# Patient Record
Sex: FEMALE | Race: WHITE | NOT HISPANIC OR LATINO | Employment: STUDENT | URBAN - METROPOLITAN AREA
[De-identification: names, ages, dates, MRNs, and addresses within clinical notes are randomized per-mention and may not be internally consistent; named-entity substitution may affect disease eponyms.]

---

## 2017-02-24 DIAGNOSIS — L70.8 OTHER ACNE: ICD-10-CM

## 2017-02-24 DIAGNOSIS — N94.6 DYSMENORRHEA: ICD-10-CM

## 2017-02-24 RX ORDER — DROSPIRENONE AND ETHINYL ESTRADIOL 0.03MG-3MG
1 KIT ORAL DAILY
Qty: 84 TABLET | Refills: 0 | Status: SHIPPED | OUTPATIENT
Start: 2017-02-24 | End: 2017-05-10

## 2017-02-24 NOTE — TELEPHONE ENCOUNTER
LES, the patient is due for an office visit. I have changed the quantity to #30 and put in a note that the patient is due for an ov.    Pending Prescriptions:                       Disp   Refills    drospirenone-ethinyl estradiol (OCELLA) 3*28 tab*0            Sig: Take 1 tablet by mouth daily      Ready to be faxed when Rx is approved.    Please forward to the  so the can call the patient and help them set up an appointment.      Telephone Information:   Mobile [phone]         Thank-You,  Rhea

## 2017-02-24 NOTE — TELEPHONE ENCOUNTER
Please let the patient know that a 3 month refill has been sent for their prescription.  They are due for a return non-fasting office visit within that time.  Failure to schedule an appointment may result in no further refills of his/her medication.

## 2017-04-04 ENCOUNTER — TELEPHONE (OUTPATIENT)
Dept: FAMILY MEDICINE | Facility: CLINIC | Age: 22
End: 2017-04-04

## 2017-04-04 NOTE — TELEPHONE ENCOUNTER
Radha's mom Lisette called to say that Radha should have refills through May on her Sertraline and BC, however, the pharmacy told her that she could only have one month and needs to be seen.  Mom Says Radha is away at school and did not plan to come back until May which is her 3 year so they are confused as to why they can't get refills through May Please advise    Lisette's phone #[phone]

## 2017-04-04 NOTE — TELEPHONE ENCOUNTER
In 5/2016, I sent refills for a year? Call pharmacy and ask why they are not filling it for a year. If she needs a single month until June to be seen/ that is what I sent?

## 2017-04-04 NOTE — TELEPHONE ENCOUNTER
Spoke to mom to let her know that the pharmacy should have refills through May and she will go speak to the pharmacy

## 2017-05-10 ENCOUNTER — OFFICE VISIT (OUTPATIENT)
Dept: FAMILY MEDICINE | Facility: CLINIC | Age: 22
End: 2017-05-10

## 2017-05-10 VITALS
HEIGHT: 67 IN | SYSTOLIC BLOOD PRESSURE: 108 MMHG | WEIGHT: 152.8 LBS | DIASTOLIC BLOOD PRESSURE: 72 MMHG | BODY MASS INDEX: 23.98 KG/M2 | HEART RATE: 78 BPM | OXYGEN SATURATION: 98 % | TEMPERATURE: 97.6 F

## 2017-05-10 DIAGNOSIS — R06.01 ORTHOPNEA: ICD-10-CM

## 2017-05-10 DIAGNOSIS — L70.8 OTHER ACNE: Primary | ICD-10-CM

## 2017-05-10 DIAGNOSIS — F41.8 MIXED ANXIETY DEPRESSIVE DISORDER: ICD-10-CM

## 2017-05-10 DIAGNOSIS — Z23 NEED FOR VACCINATION: ICD-10-CM

## 2017-05-10 DIAGNOSIS — N94.6 DYSMENORRHEA: ICD-10-CM

## 2017-05-10 DIAGNOSIS — Z71.89 ACP (ADVANCE CARE PLANNING): ICD-10-CM

## 2017-05-10 DIAGNOSIS — J31.0 CHRONIC RHINITIS: ICD-10-CM

## 2017-05-10 PROCEDURE — 90471 IMMUNIZATION ADMIN: CPT | Performed by: PHYSICIAN ASSISTANT

## 2017-05-10 PROCEDURE — 90649 4VHPV VACCINE 3 DOSE IM: CPT | Performed by: PHYSICIAN ASSISTANT

## 2017-05-10 PROCEDURE — 99213 OFFICE O/P EST LOW 20 MIN: CPT | Mod: 25 | Performed by: PHYSICIAN ASSISTANT

## 2017-05-10 RX ORDER — DROSPIRENONE AND ETHINYL ESTRADIOL 0.03MG-3MG
1 KIT ORAL DAILY
Qty: 84 TABLET | Refills: 3 | Status: SHIPPED | OUTPATIENT
Start: 2017-05-10 | End: 2018-05-15

## 2017-05-10 RX ORDER — MONTELUKAST SODIUM 10 MG/1
10 TABLET ORAL AT BEDTIME
Qty: 90 TABLET | Refills: 3 | Status: SHIPPED | OUTPATIENT
Start: 2017-05-10 | End: 2018-05-21

## 2017-05-10 ASSESSMENT — ANXIETY QUESTIONNAIRES
5. BEING SO RESTLESS THAT IT IS HARD TO SIT STILL: NOT AT ALL
7. FEELING AFRAID AS IF SOMETHING AWFUL MIGHT HAPPEN: NOT AT ALL
6. BECOMING EASILY ANNOYED OR IRRITABLE: SEVERAL DAYS
3. WORRYING TOO MUCH ABOUT DIFFERENT THINGS: NOT AT ALL
GAD7 TOTAL SCORE: 2
IF YOU CHECKED OFF ANY PROBLEMS ON THIS QUESTIONNAIRE, HOW DIFFICULT HAVE THESE PROBLEMS MADE IT FOR YOU TO DO YOUR WORK, TAKE CARE OF THINGS AT HOME, OR GET ALONG WITH OTHER PEOPLE: NOT DIFFICULT AT ALL
2. NOT BEING ABLE TO STOP OR CONTROL WORRYING: NOT AT ALL
1. FEELING NERVOUS, ANXIOUS, OR ON EDGE: SEVERAL DAYS

## 2017-05-10 ASSESSMENT — PATIENT HEALTH QUESTIONNAIRE - PHQ9: 5. POOR APPETITE OR OVEREATING: NOT AT ALL
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
## 2017-05-10 NOTE — MR AVS SNAPSHOT
After Visit Summary   5/10/2017    Radha Longoria    MRN: [id number]           Patient Information     Date Of Birth          1995        Visit Information        Provider Department      5/10/2017 2:15 PM Zoe Blevins PA [facility]        Today's Diagnoses     Other acne    -  1    ACP (advance care planning)        Dysmenorrhea        Orthopnea        Chronic rhinitis        Mixed anxiety depressive disorder        Need for vaccination          Care Instructions    1. Zatidor eye drops    2. Flonase daily in the nose.             Follow-ups after your visit        Additional Services     ALLERGY/ASTHMA ADULT REFERRAL       Your provider has referred you to: [facility]: [facility] [phone]   [email or web address]    Please be aware that coverage of these services is subject to the terms and limitations of your health insurance plan.  Call member services at your health plan with any benefit or coverage questions.      Please bring the following to your appointment:  >>   Any x-rays, CTs or MRIs which have been performed.  Contact the facility where they were done to arrange for  prior to your scheduled appointment.  Any new CT, MRI or other procedures ordered by your specialist must be performed at a [facility] facility or coordinated by your clinic's referral office.  >>   List of current medications  >>   This referral request   >>   Any documents/labs given to you for this referral                  Who to contact     If you have questions or need follow up information about today's clinic visit or your schedule please contact [facility] directly at [phone].  Normal or non-critical lab and imaging results will be communicated to you by MyChart, letter or phone within 4 business days after the clinic has received the results. If you do not hear from us within 7 days, please  "contact the clinic through [employer] or phone. If you have a critical or abnormal lab result, we will notify you by phone as soon as possible.  Submit refill requests through [employer] or call your pharmacy and they will forward the refill request to us. Please allow 3 business days for your refill to be completed.          Additional Information About Your Visit        Secure [employer] Information     [employer] gives you secure access to your electronic health record. If you see a primary care provider, you can also send messages to your care team and make appointments. If you have questions, please call your primary care clinic.  If you do not have a primary care provider, please call [phone] and they will assist you.        Care EveryWhere ID     This is your Care EveryWhere ID. This could be used by other organizations to access your [place] medical records  [id number]        Your Vitals Were     Pulse Temperature Height Last Period Pulse Oximetry Breastfeeding?    78 97.6  F (36.4  C) (Oral) 1.702 m (5' 7\") 05/07/2017 (Exact Date) 98% No    BMI (Body Mass Index)                   23.93 kg/m2            Blood Pressure from Last 3 Encounters:   05/10/17 108/72   12/21/16 100/66   07/08/16 106/68    Weight from Last 3 Encounters:   05/10/17 69.3 kg (152 lb 12.8 oz) (82 %)*   12/21/16 68.2 kg (150 lb 6.4 oz) (81 %)*   07/08/16 66.2 kg (146 lb) (79 %)*     * Growth percentiles are based on CDC 2-20 Years data.              We Performed the Following     ALLERGY/ASTHMA ADULT REFERRAL     HUMAN PAPILLOMAVIRUS VACCINE     VACCINE ADMINISTRATION, INITIAL          Where to get your medicines      These medications were sent to [place] [address] [facility] - [place], [place] - [address]  [address] [address]     Phone:  [phone]     drospirenone-ethinyl estradiol 3-0.03 MG per tablet    montelukast 10 MG tablet    sertraline 50 MG tablet          Primary Care Provider Office Phone # Fax #    Sera Owens MD " [phone]       [facility] [address]  [facility] [address]        Thank you!     Thank you for choosing [facility]  for your care. Our goal is always to provide you with excellent care. Hearing back from our patients is one way we can continue to improve our services. Please take a few minutes to complete the written survey that you may receive in the mail after your visit with us. Thank you!             Your Updated Medication List - Protect others around you: Learn how to safely use, store and throw away your medicines at www.disposemymeds.org.          This list is accurate as of: 5/10/17  4:05 PM.  Always use your most recent med list.                   Brand Name Dispense Instructions for use    albuterol 108 (90 BASE) MCG/ACT Inhaler    PROAIR HFA/PROVENTIL HFA/VENTOLIN HFA    1 Inhaler    Inhale 2 puffs into the lungs every 6 hours as needed for shortness of breath / dyspnea or wheezing       drospirenone-ethinyl estradiol 3-0.03 MG per tablet    OCELLA    84 tablet    Take 1 tablet by mouth daily       montelukast 10 MG tablet    SINGULAIR    90 tablet    Take 1 tablet (10 mg) by mouth At Bedtime       sertraline 50 MG tablet    ZOLOFT    90 tablet    Take 1 tablet (50 mg) by mouth daily

## 2017-05-10 NOTE — NURSING NOTE
Radha Longoria is here today for a recheck of her Sertraline and to discuss her allergies.    Pre-Visit Screening :  Immunizations : unknown  Asthma Action Plan/Test : NA  PHQ9/GAD7 : Completed Today  Pulse - regular  My Chart - accepts    CLASSIFICATION OF OVERWEIGHT AND OBESITY BY BMI                         Obesity Class           BMI(kg/m2)  Underweight                                    < 18.5  Normal                                         18.5-24.9  Overweight                                     25.0-29.9  OBESITY                     I                  30.0-34.9                              II                 35.0-39.9  EXTREME OBESITY             III                >40                             Patient's  BMI Body mass index is 23.93 kg/(m^2).  http://hin.nhlbi.nih.gov/menuplanner/menu.cgi  Questioned patient about current smoking habits.  Pt. has never smoked.  Annie Diaz, CMA

## 2017-05-11 ASSESSMENT — ANXIETY QUESTIONNAIRES: GAD7 TOTAL SCORE: 2

## 2017-05-11 ASSESSMENT — PATIENT HEALTH QUESTIONNAIRE - PHQ9: SUM OF ALL RESPONSES TO PHQ QUESTIONS 1-9: 0

## 2017-07-07 ENCOUNTER — TRANSFERRED RECORDS (OUTPATIENT)
Dept: FAMILY MEDICINE | Facility: CLINIC | Age: 22
End: 2017-07-07

## 2017-08-18 ENCOUNTER — OFFICE VISIT (OUTPATIENT)
Dept: FAMILY MEDICINE | Facility: CLINIC | Age: 22
End: 2017-08-18

## 2017-08-18 VITALS
WEIGHT: 155.8 LBS | DIASTOLIC BLOOD PRESSURE: 70 MMHG | HEIGHT: 67 IN | TEMPERATURE: 98.4 F | HEART RATE: 70 BPM | BODY MASS INDEX: 24.45 KG/M2 | SYSTOLIC BLOOD PRESSURE: 110 MMHG | OXYGEN SATURATION: 98 %

## 2017-08-18 DIAGNOSIS — L30.9 DERMATITIS: Primary | ICD-10-CM

## 2017-08-18 DIAGNOSIS — T24.202A BURN OF LEG, LEFT, SECOND DEGREE, INITIAL ENCOUNTER: ICD-10-CM

## 2017-08-18 PROCEDURE — 99213 OFFICE O/P EST LOW 20 MIN: CPT | Performed by: PHYSICIAN ASSISTANT

## 2017-08-18 RX ORDER — TRIAMCINOLONE ACETONIDE 1 MG/G
CREAM TOPICAL
Qty: 30 G | Refills: 0 | Status: SHIPPED | OUTPATIENT
Start: 2017-08-18 | End: 2018-06-05
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
## 2018-05-04 DIAGNOSIS — F41.8 MIXED ANXIETY DEPRESSIVE DISORDER: ICD-10-CM

## 2018-05-15 DIAGNOSIS — L70.8 OTHER ACNE: ICD-10-CM

## 2018-05-15 DIAGNOSIS — N94.6 DYSMENORRHEA: ICD-10-CM

## 2018-05-15 RX ORDER — DROSPIRENONE AND ETHINYL ESTRADIOL 0.03MG-3MG
1 KIT ORAL DAILY
Qty: 28 TABLET | Refills: 0 | COMMUNITY
Start: 2018-05-15 | End: 2018-06-05

## 2018-05-15 RX ORDER — DROSPIRENONE AND ETHINYL ESTRADIOL 0.03MG-3MG
KIT ORAL
Qty: 84 TABLET | Refills: 3 | OUTPATIENT
Start: 2018-05-15

## 2018-05-15 NOTE — TELEPHONE ENCOUNTER
[facility] in [place] called in   drospirenone-ethinyl estradiol (OCELLA) 3-0.03 MG per  Pt is due for a FASTING PHYSICAL  [facility]  [phone]

## 2018-06-05 ENCOUNTER — OFFICE VISIT (OUTPATIENT)
Dept: FAMILY MEDICINE | Facility: CLINIC | Age: 23
End: 2018-06-05

## 2018-06-05 VITALS
SYSTOLIC BLOOD PRESSURE: 98 MMHG | TEMPERATURE: 98.1 F | HEART RATE: 67 BPM | OXYGEN SATURATION: 98 % | HEIGHT: 67 IN | BODY MASS INDEX: 24.17 KG/M2 | DIASTOLIC BLOOD PRESSURE: 68 MMHG | WEIGHT: 154 LBS

## 2018-06-05 DIAGNOSIS — G44.219 EPISODIC TENSION-TYPE HEADACHE, NOT INTRACTABLE: ICD-10-CM

## 2018-06-05 DIAGNOSIS — G43.109 MIGRAINE WITH AURA AND WITHOUT STATUS MIGRAINOSUS, NOT INTRACTABLE: ICD-10-CM

## 2018-06-05 DIAGNOSIS — F41.8 MIXED ANXIETY DEPRESSIVE DISORDER: ICD-10-CM

## 2018-06-05 DIAGNOSIS — L70.0 ACNE VULGARIS: ICD-10-CM

## 2018-06-05 DIAGNOSIS — Z00.00 ROUTINE GENERAL MEDICAL EXAMINATION AT A HEALTH CARE FACILITY: Primary | ICD-10-CM

## 2018-06-05 DIAGNOSIS — J30.1 CHRONIC SEASONAL ALLERGIC RHINITIS DUE TO POLLEN: ICD-10-CM

## 2018-06-05 DIAGNOSIS — Z30.9 ENCOUNTER FOR CONTRACEPTIVE MANAGEMENT, UNSPECIFIED TYPE: ICD-10-CM

## 2018-06-05 PROCEDURE — 99395 PREV VISIT EST AGE 18-39: CPT | Performed by: PHYSICIAN ASSISTANT

## 2018-06-05 RX ORDER — DOXYCYCLINE 100 MG/1
100 CAPSULE ORAL DAILY
Qty: 30 CAPSULE | Refills: 11 | Status: SHIPPED | OUTPATIENT
Start: 2018-06-05 | End: 2019-05-31

## 2018-06-05 RX ORDER — MONTELUKAST SODIUM 10 MG/1
TABLET ORAL
Qty: 90 TABLET | Refills: 3 | Status: SHIPPED | OUTPATIENT
Start: 2018-06-05 | End: 2019-05-31

## 2018-06-05 ASSESSMENT — ANXIETY QUESTIONNAIRES
2. NOT BEING ABLE TO STOP OR CONTROL WORRYING: NOT AT ALL
7. FEELING AFRAID AS IF SOMETHING AWFUL MIGHT HAPPEN: NOT AT ALL
6. BECOMING EASILY ANNOYED OR IRRITABLE: SEVERAL DAYS
GAD7 TOTAL SCORE: 3
IF YOU CHECKED OFF ANY PROBLEMS ON THIS QUESTIONNAIRE, HOW DIFFICULT HAVE THESE PROBLEMS MADE IT FOR YOU TO DO YOUR WORK, TAKE CARE OF THINGS AT HOME, OR GET ALONG WITH OTHER PEOPLE: SOMEWHAT DIFFICULT
3. WORRYING TOO MUCH ABOUT DIFFERENT THINGS: SEVERAL DAYS
1. FEELING NERVOUS, ANXIOUS, OR ON EDGE: SEVERAL DAYS
5. BEING SO RESTLESS THAT IT IS HARD TO SIT STILL: NOT AT ALL

## 2018-06-05 ASSESSMENT — PATIENT HEALTH QUESTIONNAIRE - PHQ9: 5. POOR APPETITE OR OVEREATING: NOT AT ALL

## 2018-06-05 NOTE — MR AVS SNAPSHOT
After Visit Summary   6/5/2018    Radha Longoria    MRN: [id number]           Patient Information     Date Of Birth          1995        Visit Information        Provider Department      6/5/2018 12:30 PM Zoe Blevins PA [facility]        Today's Diagnoses     Routine general medical examination at a health care facility    -  1    Episodic tension-type headache, not intractable        Acne vulgaris        Mixed anxiety depressive disorder        Chronic seasonal allergic rhinitis due to pollen        Migraine with aura and without status migrainosus, not intractable        Encounter for contraceptive management, unspecified type           Follow-ups after your visit        Additional Services     OB/GYN REFERRAL       Your provider has referred you to:  [facility] [place] [phone]   [email or web address]    Please be aware that coverage of these services is subject to the terms and limitations of your health insurance plan.  Call member services at your health plan with any benefit or coverage questions.      Please bring the following to your appointment:  >>   Any x-rays, CTs or MRIs which have been performed.  Contact the facility where they were done to arrange for  prior to your scheduled appointment.  Any new CT, MRI or other procedures ordered by your specialist must be performed at a [facility] facility or coordinated by your clinic's referral office.    >>   List of current medications   >>   This referral request   >>   Any documents/labs given to you for this referral            PHYSICAL THERAPY REFERRAL       [facility]    Paul Holmquist [address]  [address]  [place] [address]  [phone]    Please call to schedule appointment. Please check with your insurance regarding coverage - if the recommended physician/provider above is not in your network please contact our referral  "specialist Jade for further assistance   [phone]    When you see the specialist, if any testing or Xrays are   ordered, this needs to be communicated with our   clinic referral specialists before the tests are done to make   sure the tests are covered.                  Who to contact     If you have questions or need follow up information about today's clinic visit or your schedule please contact [facility] directly at [phone].  Normal or non-critical lab and imaging results will be communicated to you by Jiahehart, letter or phone within 4 business days after the clinic has received the results. If you do not hear from us within 7 days, please contact the clinic through [employer] or phone. If you have a critical or abnormal lab result, we will notify you by phone as soon as possible.  Submit refill requests through [employer] or call your pharmacy and they will forward the refill request to us. Please allow 3 business days for your refill to be completed.          Additional Information About Your Visit        [employer] Information     [employer] gives you secure access to your electronic health record. If you see a primary care provider, you can also send messages to your care team and make appointments. If you have questions, please call your primary care clinic.  If you do not have a primary care provider, please call [phone] and they will assist you.        Care EveryWhere ID     This is your Care EveryWhere ID. This could be used by other organizations to access your [facility] medical records  [id number]        Your Vitals Were     Pulse Temperature Height Last Period Pulse Oximetry BMI (Body Mass Index)    67 98.1  F (36.7  C) (Oral) 1.702 m (5' 7\") 05/21/2018 (Exact Date) 98% 24.12 kg/m2       Blood Pressure from Last 3 Encounters:   06/05/18 98/68   08/18/17 110/70   05/10/17 108/72    Weight from Last 3 Encounters:   06/05/18 69.9 kg (154 lb)   08/18/17 70.7 kg (155 lb 12.8 " oz)   05/10/17 69.3 kg (152 lb 12.8 oz) (82 %)*     * Growth percentiles are based on [facility] 2-20 Years data.              We Performed the Following     OB/GYN REFERRAL     PHYSICAL THERAPY REFERRAL          Today's Medication Changes          These changes are accurate as of 6/5/18 11:59 PM.  If you have any questions, ask your nurse or doctor.               Start taking these medicines.        Dose/Directions    doxycycline 100 MG capsule   Commonly known as:  VIBRAMYCIN   Used for:  Acne vulgaris   Started by:  Zoe Blevins PA        Dose:  100 mg   Take 1 capsule (100 mg) by mouth daily   Quantity:  30 capsule   Refills:  11         Stop taking these medicines if you haven't already. Please contact your care team if you have questions.     OCELLA 3-0.03 MG per tablet   Generic drug:  drospirenone-ethinyl estradiol   Stopped by:  Zeo Blevins PA                Where to get your medicines      These medications were sent to [place] [address] [place] [facility] [address]  [address] [address]     Phone:  [phone]     doxycycline 100 MG capsule    montelukast 10 MG tablet    sertraline 50 MG tablet                Primary Care Provider Office Phone # Fax #    Sera Owens [phone]       [facility] [address] [address]        Equal Access to Services     [facility] DANIELLA : Hadii aad ku hadasho Soomaali, waaxda luqadaha, qaybta kaalmada adeegyada, christianne pantoja. So [facility] [phone].    ATENCIÓN: Si habla español, tiene a sweet disposición servicios gratuitos de asistencia lingüística. Vaishnavi al [phone].    We comply with applicable federal civil rights laws and [place] laws. We do not discriminate on the basis of race, color, national origin, age, disability, sex, sexual orientation, or gender identity.            Thank you!     Thank you for choosing [facility]  for your  care. Our goal is always to provide you with excellent care. Hearing back from our patients is one way we can continue to improve our services. Please take a few minutes to complete the written survey that you may receive in the mail after your visit with us. Thank you!             Your Updated Medication List - Protect others around you: Learn how to safely use, store and throw away your medicines at www.disposemymeds.org.          This list is accurate as of 6/5/18 11:59 PM.  Always use your most recent med list.                   Brand Name Dispense Instructions for use Diagnosis    albuterol 108 (90 Base) MCG/ACT Inhaler    PROAIR HFA/PROVENTIL HFA/VENTOLIN HFA    1 Inhaler    Inhale 2 puffs into the lungs every 6 hours as needed for shortness of breath / dyspnea or wheezing    Chronic cough, Allergic state, subsequent encounter       doxycycline 100 MG capsule    VIBRAMYCIN    30 capsule    Take 1 capsule (100 mg) by mouth daily    Acne vulgaris       montelukast 10 MG tablet    SINGULAIR    90 tablet    TAKE 1 TABLET (10 MG) BY MOUTH AT BEDTIME    Chronic seasonal allergic rhinitis due to pollen       sertraline 50 MG tablet    ZOLOFT    90 tablet    Take 1 tablet (50 mg) by mouth daily    Mixed anxiety depressive disorder

## 2018-06-05 NOTE — NURSING NOTE
Patient is here for a full physical exam.    Pre-Visit Screening :  Immunizations : up to date  Colon Screening : NA  Mammogram: NA  Asthma Action Test/Plan : No concerns  PHQ9 :  PHQ-9 given today   GAD7 :  SAMANTHA-7 given today   Patient's  BMI Body mass index is 24.12 kg/(m^2).  Questioned patient about current smoking habits.  Pt. has never smoked.  OK to leave a detailed voice message regarding today's visit Yes, phone # [phone]      ETOH screening:  Questions:  1-How often do you have a drink containing alcohol?                            Patient does not drink   2-How many drinks containing alcohol do you have on a typical day when you are         Drinking?                              Does not drink   3- How often do you have 5 or more drinks on one occasion?                              Never per
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
## 2018-06-06 ASSESSMENT — PATIENT HEALTH QUESTIONNAIRE - PHQ9: SUM OF ALL RESPONSES TO PHQ QUESTIONS 1-9: 1

## 2018-06-06 ASSESSMENT — ASTHMA QUESTIONNAIRES: ACT_TOTALSCORE: 23

## 2018-06-06 ASSESSMENT — ANXIETY QUESTIONNAIRES: GAD7 TOTAL SCORE: 3

## 2018-06-16 DIAGNOSIS — Z30.41 SURVEILLANCE OF PREVIOUSLY PRESCRIBED CONTRACEPTIVE PILL: Primary | ICD-10-CM

## 2018-06-16 RX ORDER — DROSPIRENONE AND ETHINYL ESTRADIOL 0.03MG-3MG
KIT ORAL
Qty: 84 TABLET | Refills: 3 | Status: SHIPPED | OUTPATIENT
Start: 2018-06-16 | End: 2019-01-22

## 2018-06-16 RX ORDER — DROSPIRENONE AND ETHINYL ESTRADIOL 0.03MG-3MG
1 KIT ORAL DAILY
Refills: 0 | COMMUNITY
Start: 2018-05-15 | End: 2019-01-22

## 2018-06-16 NOTE — TELEPHONE ENCOUNTER
Last office visit 6/2018    Pending Prescriptions:                       Disp   Refills    drospirenone-ethinyl estradiol (ELIZABETH) 3*84 tab*3            Sig: TAKE 1 TABLET BY MOUTH EVERY DAY    Signed Prescriptions:                        Disp   Refills    drospirenone-ethinyl estradiol (ELIZABETH) 3-*       0        Sig: Take 1 tablet by mouth daily  Authorizing Provider: PATIENT REPORTED  Ordering User: JESSIKA POSEY

## 2018-10-19 ENCOUNTER — HEALTH MAINTENANCE LETTER (OUTPATIENT)
Age: 23
End: 2018-10-19

## 2019-01-21 ENCOUNTER — MYC MEDICAL ADVICE (OUTPATIENT)
Dept: FAMILY MEDICINE | Facility: CLINIC | Age: 24
End: 2019-01-21

## 2019-01-21 DIAGNOSIS — L70.0 ACNE VULGARIS: Primary | ICD-10-CM

## 2019-01-22 NOTE — TELEPHONE ENCOUNTER
From: Radha Longoria  To: Zoe Blevins PA  Sent: 1/21/2019 8:11 PM CST  Subject: Question about medications    Dr. Blevins,    I am messaging you in regards to my birth control. I have been having some hormonal acne on my chin and jawline since being off of my birth control. I am wondering if there is any way I could get back on it or try a different brand. The doxycycline that I am on for my acne does not seem to be doing a well enough job for my hormonal acne. Let me know what you can do, thank you! I appreciate it.

## 2019-05-28 ENCOUNTER — TELEPHONE (OUTPATIENT)
Dept: FAMILY MEDICINE | Facility: CLINIC | Age: 24
End: 2019-05-28

## 2019-05-28 DIAGNOSIS — F41.8 MIXED ANXIETY DEPRESSIVE DISORDER: ICD-10-CM

## 2019-05-28 NOTE — TELEPHONE ENCOUNTER
Received incoming refill request for   Pending Prescriptions:                       Disp   Refills    sertraline (ZOLOFT) 50 MG tablet          90 tab*3            Sig: Take 1 tablet (50 mg) by mouth daily    Patient last had refills of this medication on 6/5/18 for a year supply and is now due to be seen for office visit. A 30 day supply was faxed into the pharmacy for the patient as extension, routing to  to call and schedule patient for non fasting medication check.
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
## 2019-05-31 ENCOUNTER — OFFICE VISIT (OUTPATIENT)
Dept: FAMILY MEDICINE | Facility: CLINIC | Age: 24
End: 2019-05-31

## 2019-05-31 VITALS
SYSTOLIC BLOOD PRESSURE: 100 MMHG | DIASTOLIC BLOOD PRESSURE: 68 MMHG | HEART RATE: 63 BPM | OXYGEN SATURATION: 98 % | TEMPERATURE: 98 F

## 2019-05-31 DIAGNOSIS — L70.0 ACNE VULGARIS: ICD-10-CM

## 2019-05-31 DIAGNOSIS — Z30.9 ENCOUNTER FOR CONTRACEPTIVE MANAGEMENT, UNSPECIFIED TYPE: ICD-10-CM

## 2019-05-31 DIAGNOSIS — J30.1 CHRONIC SEASONAL ALLERGIC RHINITIS DUE TO POLLEN: ICD-10-CM

## 2019-05-31 DIAGNOSIS — J45.20 MILD INTERMITTENT ASTHMA WITHOUT COMPLICATION: Primary | ICD-10-CM

## 2019-05-31 DIAGNOSIS — F41.8 MIXED ANXIETY DEPRESSIVE DISORDER: ICD-10-CM

## 2019-05-31 PROCEDURE — 99214 OFFICE O/P EST MOD 30 MIN: CPT | Mod: 25 | Performed by: PHYSICIAN ASSISTANT

## 2019-05-31 PROCEDURE — 90471 IMMUNIZATION ADMIN: CPT | Performed by: PHYSICIAN ASSISTANT

## 2019-05-31 PROCEDURE — 90714 TD VACC NO PRESV 7 YRS+ IM: CPT | Performed by: PHYSICIAN ASSISTANT

## 2019-05-31 RX ORDER — ALBUTEROL SULFATE 90 UG/1
2 AEROSOL, METERED RESPIRATORY (INHALATION) EVERY 6 HOURS
Qty: 8.5 G | Refills: 1 | Status: SHIPPED | OUTPATIENT
Start: 2019-05-31 | End: 2019-07-15

## 2019-05-31 RX ORDER — DOXYCYCLINE 100 MG/1
100 CAPSULE ORAL DAILY
Qty: 90 CAPSULE | Refills: 3 | Status: SHIPPED | OUTPATIENT
Start: 2019-05-31 | End: 2020-07-20

## 2019-05-31 RX ORDER — MONTELUKAST SODIUM 10 MG/1
TABLET ORAL
Qty: 90 TABLET | Refills: 3 | Status: SHIPPED | OUTPATIENT
Start: 2019-05-31 | End: 2020-07-20

## 2019-05-31 ASSESSMENT — PATIENT HEALTH QUESTIONNAIRE - PHQ9
5. POOR APPETITE OR OVEREATING: NOT AT ALL
SUM OF ALL RESPONSES TO PHQ QUESTIONS 1-9: 2

## 2019-05-31 ASSESSMENT — ANXIETY QUESTIONNAIRES
GAD7 TOTAL SCORE: 2
7. FEELING AFRAID AS IF SOMETHING AWFUL MIGHT HAPPEN: NOT AT ALL
IF YOU CHECKED OFF ANY PROBLEMS ON THIS QUESTIONNAIRE, HOW DIFFICULT HAVE THESE PROBLEMS MADE IT FOR YOU TO DO YOUR WORK, TAKE CARE OF THINGS AT HOME, OR GET ALONG WITH OTHER PEOPLE: NOT DIFFICULT AT ALL
2. NOT BEING ABLE TO STOP OR CONTROL WORRYING: NOT AT ALL
5. BEING SO RESTLESS THAT IT IS HARD TO SIT STILL: NOT AT ALL
3. WORRYING TOO MUCH ABOUT DIFFERENT THINGS: SEVERAL DAYS
1. FEELING NERVOUS, ANXIOUS, OR ON EDGE: NOT AT ALL
6. BECOMING EASILY ANNOYED OR IRRITABLE: SEVERAL DAYS

## 2019-05-31 NOTE — NURSING NOTE
Radha is here today for a non-fasting med recheck.    Pre-visit Screening:  Immunizations:  not up to date - td today  Colonoscopy:  NA  Mammogram: NA  Asthma Action Test/Plan:  NA  PHQ9:  Done today  GAD7:  Done today  Questioned patient about current smoking habits Pt. has never smoked.  Ok to leave detailed message on voice mail for today's visit only Yes, phone # [phone]

## 2019-05-31 NOTE — LETTER
My Asthma Action Plan  Name: Radha Longoria   YOB: 1995  Date: 5/31/2019   My doctor: AREN Espino   My clinic: [facility]        My Control Medicine: None  My Rescue Medicine: Albuterol (Proair/Ventolin/Proventil) inhaler 1-2 puffs as needed   My Asthma Severity: intermittent  Avoid your asthma triggers: pollens, animal dander and exercise or sports            GREEN ZONE   Good Control    I feel good    No cough or wheeze    Can work, sleep and play without asthma symptoms       Take your asthma control medicine every day.     1. If exercise triggers your asthma, take your rescue medication    15 minutes before exercise or sports, and    During exercise if you have asthma symptoms  2. Spacer to use with inhaler: If you have a spacer, make sure to use it with your inhaler             YELLOW ZONE Getting Worse  I have ANY of these:    I do not feel good    Cough or wheeze    Chest feels tight    Wake up at night   1. Keep taking your Green Zone medications  2. Start taking your rescue medicine:    every 20 minutes for up to 1 hour. Then every 4 hours for 24-48 hours.  3. If you stay in the Yellow Zone for more than 12-24 hours, contact your doctor.  4. If you do not return to the Green Zone in 12-24 hours or you get worse, start taking your oral steroid medicine if prescribed by your provider.           RED ZONE Medical Alert - Get Help  I have ANY of these:    I feel awful    Medicine is not helping    Breathing getting harder    Trouble walking or talking    Nose opens wide to breathe       1. Take your rescue medicine NOW  2. If your provider has prescribed an oral steroid medicine, start taking it NOW  3. Call your doctor NOW  4. If you are still in the Red Zone after 20 minutes and you have not reached your doctor:    Take your rescue medicine again and    Call 911 or go to the emergency room right away    See your regular doctor within 2 weeks of an  Emergency Room or Urgent Care visit for follow-up treatment.          Annual Reminders:  Meet with Asthma Educator,  Flu Shot in the Fall, consider Pneumonia Vaccination for patients with asthma (aged 19 and older).    Pharmacy:    [facility]Y #[id number] - [place], [place] - [address] AT CORNER OF [facility] [id number] [address] - [place], [place] - [address] [facility]                      Asthma Triggers  How To Control Things That Make Your Asthma Worse    Triggers are things that make your asthma worse.  Look at the list below to help you find your triggers and what you can do about them.  You can help prevent asthma flare-ups by staying away from your triggers.      Trigger                                                          What you can do   Cigarette Smoke  Tobacco smoke can make asthma worse. Do not allow smoking in your home, car or around you.  Be sure no one smokes at a child s day care or school.  If you smoke, ask your health care provider for ways to help you quit.  Ask family members to quit too.  Ask your health care provider for a referral to Quit Plan to help you quit smoking, or call 1-349-591-PLAN.     Colds, Flu, Bronchitis  These are common triggers of asthma. Wash your hands often.  Don t touch your eyes, nose or mouth.  Get a flu shot every year.     Dust Mites  These are tiny bugs that live in cloth or carpet. They are too small to see. Wash sheets and blankets in hot water every week.   Encase pillows and mattress in dust mite proof covers.  Avoid having carpet if you can. If you have carpet, vacuum weekly.   Use a dust mask and HEPA vacuum.   Pollen and Outdoor Mold  Some people are allergic to trees, grass, or weed pollen, or molds. Try to keep your windows closed.  Limit time out doors when pollen count is high.   Ask you health care provider about taking medicine during allergy season.     Animal Dander  Some people are allergic to skin flakes, urine or saliva from pets with fur or  feathers. Keep pets with fur or feathers out of your home.    If you can t keep the pet outdoors, then keep the pet out of your bedroom.  Keep the bedroom door closed.  Keep pets off cloth furniture and away from stuffed toys.     Mice, Rats, and Cockroaches  Some people are allergic to the waste from these pests.   Cover food and garbage.  Clean up spills and food crumbs.  Store grease in the refrigerator.   Keep food out of the bedroom.   Indoor Mold  This can be a trigger if your home has high moisture. Fix leaking faucets, pipes, or other sources of water.   Clean moldy surfaces.  Dehumidify basement if it is damp and smelly.   Smoke, Strong Odors, and Sprays  These can reduce air quality. Stay away from strong odors and sprays, such as perfume, powder, hair spray, paints, smoke incense, paint, cleaning products, candles and new carpet.   Exercise or Sports  Some people with asthma have this trigger. Be active!  Ask your doctor about taking medicine before sports or exercise to prevent symptoms.    Warm up for 5-10 minutes before and after sports or exercise.     Other Triggers of Asthma  Cold air:  Cover your nose and mouth with a scarf.  Sometimes laughing or crying can be a trigger.  Some medicines and food can trigger asthma.

## 2019-06-01 ASSESSMENT — ASTHMA QUESTIONNAIRES: ACT_TOTALSCORE: 25

## 2019-06-01 ASSESSMENT — ANXIETY QUESTIONNAIRES: GAD7 TOTAL SCORE: 2

## 2019-06-13 LAB — PAP SMEAR - HIM PATIENT REPORTED: NEGATIVE

## 2019-07-15 DIAGNOSIS — J45.20 MILD INTERMITTENT ASTHMA WITHOUT COMPLICATION: ICD-10-CM

## 2019-07-15 RX ORDER — ALBUTEROL SULFATE 90 UG/1
AEROSOL, METERED RESPIRATORY (INHALATION)
Qty: 18 INHALER | Refills: 1 | Status: SHIPPED | OUTPATIENT
Start: 2019-07-15 | End: 2021-08-02

## 2019-07-15 NOTE — TELEPHONE ENCOUNTER
Pending Prescriptions:                       Disp   Refills    VENTOLIN  (90 Base) MCG/ACT inhale*18 Inh*             Sig: INHALE 2 PUFFS INTO THE LUNGS EVERY 6 HOURS    Please review:    Last refill and ov was 5- per notes rtc in one year  Is this good for one year?  Change qty fax or deny and close encounter  Xochitl  [phone] (home)

## 2019-09-27 ENCOUNTER — HEALTH MAINTENANCE LETTER (OUTPATIENT)
Age: 24
End: 2019-09-27

## 2020-06-17 DIAGNOSIS — F41.8 MIXED ANXIETY DEPRESSIVE DISORDER: ICD-10-CM

## 2020-06-17 NOTE — TELEPHONE ENCOUNTER
Radha Longoria is requesting a refill of:    Refused Prescriptions:                       Disp   Refills    sertraline (ZOLOFT) 50 MG tablet [Pharmacy*90 tab*3        Sig: TAKE 1 TABLET BY MOUTH EVERY DAY  Refused By: YAMILET HERNANDEZ  Reason for Refusal: Patient needs appointment    Pt last seen 05/31/19, pt due for yearly OV

## 2020-07-20 ENCOUNTER — OFFICE VISIT (OUTPATIENT)
Dept: FAMILY MEDICINE | Facility: CLINIC | Age: 25
End: 2020-07-20

## 2020-07-20 VITALS
BODY MASS INDEX: 28.57 KG/M2 | SYSTOLIC BLOOD PRESSURE: 110 MMHG | DIASTOLIC BLOOD PRESSURE: 70 MMHG | OXYGEN SATURATION: 97 % | TEMPERATURE: 97.9 F | WEIGHT: 182.4 LBS | HEART RATE: 69 BPM

## 2020-07-20 DIAGNOSIS — Z23 NEED FOR VACCINATION: ICD-10-CM

## 2020-07-20 DIAGNOSIS — F41.8 MIXED ANXIETY DEPRESSIVE DISORDER: Primary | ICD-10-CM

## 2020-07-20 DIAGNOSIS — L70.0 ACNE VULGARIS: ICD-10-CM

## 2020-07-20 DIAGNOSIS — J30.1 CHRONIC SEASONAL ALLERGIC RHINITIS DUE TO POLLEN: ICD-10-CM

## 2020-07-20 PROCEDURE — 99213 OFFICE O/P EST LOW 20 MIN: CPT | Mod: 25 | Performed by: PHYSICIAN ASSISTANT

## 2020-07-20 PROCEDURE — 90471 IMMUNIZATION ADMIN: CPT | Performed by: PHYSICIAN ASSISTANT

## 2020-07-20 PROCEDURE — 90732 PPSV23 VACC 2 YRS+ SUBQ/IM: CPT | Performed by: PHYSICIAN ASSISTANT

## 2020-07-20 RX ORDER — MONTELUKAST SODIUM 10 MG/1
TABLET ORAL
Qty: 90 TABLET | Refills: 3 | Status: SHIPPED | OUTPATIENT
Start: 2020-07-20 | End: 2021-08-02

## 2020-07-20 ASSESSMENT — ANXIETY QUESTIONNAIRES
IF YOU CHECKED OFF ANY PROBLEMS ON THIS QUESTIONNAIRE, HOW DIFFICULT HAVE THESE PROBLEMS MADE IT FOR YOU TO DO YOUR WORK, TAKE CARE OF THINGS AT HOME, OR GET ALONG WITH OTHER PEOPLE: NOT DIFFICULT AT ALL
2. NOT BEING ABLE TO STOP OR CONTROL WORRYING: NOT AT ALL
6. BECOMING EASILY ANNOYED OR IRRITABLE: SEVERAL DAYS
7. FEELING AFRAID AS IF SOMETHING AWFUL MIGHT HAPPEN: NOT AT ALL
GAD7 TOTAL SCORE: 2
1. FEELING NERVOUS, ANXIOUS, OR ON EDGE: SEVERAL DAYS
5. BEING SO RESTLESS THAT IT IS HARD TO SIT STILL: NOT AT ALL
3. WORRYING TOO MUCH ABOUT DIFFERENT THINGS: NOT AT ALL

## 2020-07-20 ASSESSMENT — PATIENT HEALTH QUESTIONNAIRE - PHQ9
SUM OF ALL RESPONSES TO PHQ QUESTIONS 1-9: 2
5. POOR APPETITE OR OVEREATING: NOT AT ALL
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
## 2020-07-20 NOTE — NURSING NOTE
Radha is here for med refill non fasting    Pre-visit Screening:  Immunizations:  up to date  Colonoscopy:  NA  Mammogram: NA  Asthma Action Test/Plan:  Done today  PHQ9:  Done today  GAD7:  Done today  Questioned patient about current smoking habits Pt. has never smoked.  Ok to leave detailed message on voice mail for today's visit only Yes, phone # [phone]

## 2020-07-21 ASSESSMENT — ANXIETY QUESTIONNAIRES: GAD7 TOTAL SCORE: 2

## 2020-07-21 ASSESSMENT — ASTHMA QUESTIONNAIRES: ACT_TOTALSCORE: 25

## 2021-01-09 ENCOUNTER — HEALTH MAINTENANCE LETTER (OUTPATIENT)
Age: 26
End: 2021-01-09

## 2021-07-25 DIAGNOSIS — F41.8 MIXED ANXIETY DEPRESSIVE DISORDER: ICD-10-CM

## 2021-07-25 DIAGNOSIS — J30.1 CHRONIC SEASONAL ALLERGIC RHINITIS DUE TO POLLEN: ICD-10-CM

## 2021-07-26 RX ORDER — MONTELUKAST SODIUM 10 MG/1
TABLET ORAL
Qty: 90 TABLET | Refills: 3 | COMMUNITY
Start: 2021-07-26

## 2021-07-26 NOTE — TELEPHONE ENCOUNTER
Pt needs yearly OV.     Refused Prescriptions:                       Disp   Refills    sertraline (ZOLOFT) 50 MG tablet [Pharmacy*90 tab*3        Sig: TAKE 1 TABLET BY MOUTH EVERY DAY  Refused By: ALCIRA PERKINS  Reason for Refusal: Patient needs appointment    montelukast (SINGULAIR) 10 MG tablet [Phar*90 tab*3        Sig: TAKE 1 TABLET BY MOUTH AT BEDTIME  Refused By: ALCIRA PERKINS  Reason for Refusal: Patient needs appointment

## 2021-07-30 ENCOUNTER — MYC MEDICAL ADVICE (OUTPATIENT)
Dept: FAMILY MEDICINE | Facility: CLINIC | Age: 26
End: 2021-07-30

## 2021-07-30 DIAGNOSIS — F41.8 MIXED ANXIETY DEPRESSIVE DISORDER: ICD-10-CM

## 2021-07-30 NOTE — TELEPHONE ENCOUNTER
Called in 5 day supply of sertraline for the patient to get until scheduled appt with Zoe on Monday.     Temperature instability in  Feeding difficulty in  due to oral motor dysfunction

## 2021-08-02 ENCOUNTER — OFFICE VISIT (OUTPATIENT)
Dept: FAMILY MEDICINE | Facility: CLINIC | Age: 26
End: 2021-08-02

## 2021-08-02 VITALS
WEIGHT: 179 LBS | SYSTOLIC BLOOD PRESSURE: 106 MMHG | OXYGEN SATURATION: 98 % | RESPIRATION RATE: 20 BRPM | BODY MASS INDEX: 28.09 KG/M2 | HEART RATE: 69 BPM | TEMPERATURE: 97.8 F | HEIGHT: 67 IN | DIASTOLIC BLOOD PRESSURE: 74 MMHG

## 2021-08-02 DIAGNOSIS — J45.20 MILD INTERMITTENT ASTHMA WITHOUT COMPLICATION: ICD-10-CM

## 2021-08-02 DIAGNOSIS — J30.1 CHRONIC SEASONAL ALLERGIC RHINITIS DUE TO POLLEN: ICD-10-CM

## 2021-08-02 DIAGNOSIS — F41.8 MIXED ANXIETY DEPRESSIVE DISORDER: Primary | ICD-10-CM

## 2021-08-02 DIAGNOSIS — E66.3 OVERWEIGHT (BMI 25.0-29.9): ICD-10-CM

## 2021-08-02 DIAGNOSIS — Z28.21 COVID-19 VIRUS VACCINATION REFUSED: ICD-10-CM

## 2021-08-02 LAB
CHOLEST SERPL-MCNC: 153 MG/DL (ref 0–199)
CHOLEST/HDLC SERPL: 3 {RATIO} (ref 0–5)
GLUCOSE SERPL-MCNC: 93 MG/DL (ref 60–99)
HDLC SERPL-MCNC: 53 MG/DL (ref 40–150)
LDLC SERPL CALC-MCNC: 85 MG/DL (ref 0–130)
TRIGL SERPL-MCNC: 73 MG/DL (ref 0–149)

## 2021-08-02 PROCEDURE — 99213 OFFICE O/P EST LOW 20 MIN: CPT | Performed by: PHYSICIAN ASSISTANT

## 2021-08-02 PROCEDURE — 82947 ASSAY GLUCOSE BLOOD QUANT: CPT | Performed by: PHYSICIAN ASSISTANT

## 2021-08-02 PROCEDURE — 80061 LIPID PANEL: CPT | Performed by: PHYSICIAN ASSISTANT

## 2021-08-02 PROCEDURE — 36415 COLL VENOUS BLD VENIPUNCTURE: CPT | Performed by: PHYSICIAN ASSISTANT

## 2021-08-02 RX ORDER — TRETINOIN 1 MG/G
CREAM TOPICAL
COMMUNITY
Start: 2020-11-10 | End: 2023-11-21

## 2021-08-02 RX ORDER — SPIRONOLACTONE 100 MG/1
100 TABLET, FILM COATED ORAL DAILY
COMMUNITY
Start: 2021-06-27 | End: 2023-11-21

## 2021-08-02 RX ORDER — MONTELUKAST SODIUM 10 MG/1
TABLET ORAL
Qty: 90 TABLET | Refills: 3 | Status: SHIPPED | OUTPATIENT
Start: 2021-08-02 | End: 2022-08-02

## 2021-08-02 ASSESSMENT — PATIENT HEALTH QUESTIONNAIRE - PHQ9
SUM OF ALL RESPONSES TO PHQ QUESTIONS 1-9: 1
5. POOR APPETITE OR OVEREATING: NOT AT ALL

## 2021-08-02 ASSESSMENT — ANXIETY QUESTIONNAIRES
5. BEING SO RESTLESS THAT IT IS HARD TO SIT STILL: NOT AT ALL
IF YOU CHECKED OFF ANY PROBLEMS ON THIS QUESTIONNAIRE, HOW DIFFICULT HAVE THESE PROBLEMS MADE IT FOR YOU TO DO YOUR WORK, TAKE CARE OF THINGS AT HOME, OR GET ALONG WITH OTHER PEOPLE: SOMEWHAT DIFFICULT
6. BECOMING EASILY ANNOYED OR IRRITABLE: SEVERAL DAYS
2. NOT BEING ABLE TO STOP OR CONTROL WORRYING: NOT AT ALL
1. FEELING NERVOUS, ANXIOUS, OR ON EDGE: SEVERAL DAYS
3. WORRYING TOO MUCH ABOUT DIFFERENT THINGS: SEVERAL DAYS
GAD7 TOTAL SCORE: 3
7. FEELING AFRAID AS IF SOMETHING AWFUL MIGHT HAPPEN: NOT AT ALL

## 2021-08-02 ASSESSMENT — MIFFLIN-ST. JEOR: SCORE: 1599.57

## 2021-08-02 NOTE — NURSING NOTE
Chief Complaint   Patient presents with     Recheck Medication     fasting medication check and review     Pre-visit Screening:  Immunizations:  up to date  Colonoscopy:  NA  Mammogram: NA  Asthma Action Test/Plan:  ACT given today   PHQ9:  Given today   GAD7:  Given today   Questioned patient about current smoking habits Pt. has never smoked.  Ok to leave detailed message on voice mail for today's visit only Yes, phone # [phone]

## 2021-08-02 NOTE — PROGRESS NOTES
Assessment & Plan     Mixed anxiety depressive disorder  controlled  - sertraline (ZOLOFT) 50 MG tablet; Take 1 tablet (50 mg) by mouth daily    Chronic seasonal allergic rhinitis due to pollen  Stable  OK for albuterol refill if calls  - montelukast (SINGULAIR) 10 MG tablet; TAKE 1 TABLET (10 MG) BY MOUTH AT BEDTIME    Mild intermittent asthma without complication  Controlled on Singulair    Overweight (BMI 25.0-29.9)    - VENOUS COLLECTION  - Lipid Panel (BFP)  - Glucose Fasting (BFP)    COVID-19 virus vaccination refused  Advised vaccination, increased risk of complications due to asthma.        FUTURE APPOINTMENTS:       - Follow-up visit in 1 year      AREN Espino  [facility]    Subjective     Nursing Notes:   Kayla Braden CMA  8/2/2021  8:21 AM  Signed  Chief Complaint   Patient presents with     Recheck Medication     fasting medication check and review     Pre-visit Screening:  Immunizations:  up to date  Colonoscopy:  NA  Mammogram: NA  Asthma Action Test/Plan:  ACT given today   PHQ9:  Given today   GAD7:  Given today   Questioned patient about current smoking habits Pt. has never smoked.  Ok to leave detailed message on voice mail for today's visit only Yes, phone # [phone]           Radha Longoria is a 25 year old female who presents to clinic today for the following health issues     HPI     COVID VACCINE - hasn't gotten it because she hasn't been sick in a long time    Wt Readings from Last 5 Encounters:   08/02/21 81.2 kg (179 lb)   07/20/20 82.7 kg (182 lb 6.4 oz)   06/05/18 69.9 kg (154 lb)   08/18/17 70.7 kg (155 lb 12.8 oz)   05/10/17 69.3 kg (152 lb 12.8 oz) (82 %, Z= 0.93)*     * Growth percentiles are based on CDC (Girls, 2-20 Years) data.         Had Pap at OBGYN.   IUD in place - menses monthly      Depression and Anxiety Follow-Up    How are you doing with your depression since your last visit? No change    How are you doing with your anxiety  since your last visit?  No change    Are you having other symptoms that might be associated with depression or anxiety? No    Have you had a significant life event? No     Do you have any concerns with your use of alcohol or other drugs? No    Social History     Tobacco Use     Smoking status: Never Smoker     Smokeless tobacco: Never Used     Tobacco comment: non- smoking home    Substance Use Topics     Alcohol use: Yes     Alcohol/week: 1.0 standard drinks     Types: 1 Standard drinks or equivalent per week     Drug use: No     PHQ 6/5/2018 5/31/2019 7/20/2020   PHQ-9 Total Score 1 2 2   Q9: Thoughts of better off dead/self-harm past 2 weeks Not at all Not at all Not at all     SAMANTHA-7 SCORE 6/5/2018 5/31/2019 7/20/2020   Total Score 3 2 2       Asthma Follow-Up    Was ACT completed today?  YES - controlled  No albuterol use  Declines refill on albuterol    Migraine     Since your last clinic visit, how have your headaches changed?  Improved    How often are you getting headaches or migraines?  A couple times a year     Taking Excedrine Migraine    Chiro 2x a month              Current Medications  Current Outpatient Medications   Medication Sig Dispense Refill     levonorgestrel (MIRENA) 20 MCG/24HR IUD 1 each (20 mcg) by Intrauterine route once for 1 dose 1 each 0     montelukast (SINGULAIR) 10 MG tablet TAKE 1 TABLET (10 MG) BY MOUTH AT BEDTIME 90 tablet 3     sertraline (ZOLOFT) 50 MG tablet Take 1 tablet (50 mg) by mouth daily 90 tablet 3     spironolactone (ALDACTONE) 100 MG tablet Take 100 mg by mouth daily       tretinoin (RETIN-A) 0.1 % external cream APPLY PEA SIZED AMOUNT TO FACE NIGHTLY AS TOLERATED.           Constitutional, HEENT, Cardiovascular, Pulmonary, GI and  systems are negative, except as otherwise noted.          Objective    /74 (BP Location: Left arm, Patient Position: Sitting, Cuff Size: Adult Regular)   Pulse 69   Temp 97.8  F (36.6  C) (Temporal)   Resp 20   Ht 1.702 m (5'  "7\")   Wt 81.2 kg (179 lb)   LMP 07/12/2021 (Within Days)   SpO2 98%   BMI 28.04 kg/m    Body mass index is 28.04 kg/m .  Physical Exam   GENERAL: healthy, alert and no distress  EYES: Eyes grossly normal to inspection, PERRL and conjunctivae and sclerae normal  HENT: ear canals and TM's normal, nose and mouth without ulcers or lesions  NECK: no adenopathy, no asymmetry, masses, or scars and thyroid normal to palpation  RESP: lungs clear to auscultation - no rales, rhonchi or wheezes  CV: regular rate and rhythm, normal S1 S2, no S3 or S4, no murmur, click or rub, no peripheral edema and peripheral pulses strong  MS: no gross musculoskeletal defects noted, no edema    Mental Status Exam:   Appearance: calm  Grooming: adequately groomed  Demeanor: engaged, cooperative  Affect: normal  Speech: Normal.  Gait:Normal.  Movements: Normal  Form of thought: Logical, Linear and Goal directed  Thought content:  Normal  Insight:Good   Judgment: Good   Cognition: Good           "

## 2021-08-03 ASSESSMENT — ANXIETY QUESTIONNAIRES: GAD7 TOTAL SCORE: 3

## 2021-08-03 ASSESSMENT — ASTHMA QUESTIONNAIRES: ACT_TOTALSCORE: 25

## 2021-08-05 ENCOUNTER — TRANSFERRED RECORDS (OUTPATIENT)
Dept: FAMILY MEDICINE | Facility: CLINIC | Age: 26
End: 2021-08-05

## 2021-10-23 ENCOUNTER — HEALTH MAINTENANCE LETTER (OUTPATIENT)
Age: 26
End: 2021-10-23

## 2022-02-12 ENCOUNTER — HEALTH MAINTENANCE LETTER (OUTPATIENT)
Age: 27
End: 2022-02-12

## 2022-07-27 DIAGNOSIS — F41.8 MIXED ANXIETY DEPRESSIVE DISORDER: ICD-10-CM

## 2022-07-27 DIAGNOSIS — J30.1 CHRONIC SEASONAL ALLERGIC RHINITIS DUE TO POLLEN: ICD-10-CM

## 2022-07-27 RX ORDER — MONTELUKAST SODIUM 10 MG/1
TABLET ORAL
Qty: 90 TABLET | Refills: 3 | COMMUNITY
Start: 2022-07-27

## 2022-07-27 NOTE — TELEPHONE ENCOUNTER
Radha Longoria is requesting a refill of:    Refused Prescriptions:                       Disp   Refills    montelukast (SINGULAIR) 10 MG tablet [Phar*90 tab*3        Sig: TAKE 1 TABLET BY MOUTH EVERYDAY AT BEDTIME  Refused By: YAMILET HERNANDEZ  Reason for Refusal: Patient needs appointment    sertraline (ZOLOFT) 50 MG tablet [Pharmacy*90 tab*3        Sig: TAKE 1 TABLET BY MOUTH EVERY DAY  Refused By: YAMILET HERNANDEZ  Reason for Refusal: Patient needs appointment    Pt last seen 08/02/21 due for yearly OV. Sent [employer] message

## 2022-08-02 ENCOUNTER — OFFICE VISIT (OUTPATIENT)
Dept: FAMILY MEDICINE | Facility: CLINIC | Age: 27
End: 2022-08-02

## 2022-08-02 VITALS
SYSTOLIC BLOOD PRESSURE: 104 MMHG | OXYGEN SATURATION: 97 % | HEART RATE: 65 BPM | DIASTOLIC BLOOD PRESSURE: 68 MMHG | TEMPERATURE: 98.4 F | WEIGHT: 180.8 LBS | HEIGHT: 67 IN | BODY MASS INDEX: 28.38 KG/M2

## 2022-08-02 DIAGNOSIS — R41.840 INATTENTION: ICD-10-CM

## 2022-08-02 DIAGNOSIS — F41.8 MIXED ANXIETY DEPRESSIVE DISORDER: Primary | ICD-10-CM

## 2022-08-02 DIAGNOSIS — J30.1 CHRONIC SEASONAL ALLERGIC RHINITIS DUE TO POLLEN: ICD-10-CM

## 2022-08-02 DIAGNOSIS — G43.809 OTHER MIGRAINE WITHOUT STATUS MIGRAINOSUS, NOT INTRACTABLE: ICD-10-CM

## 2022-08-02 PROBLEM — J45.20 MILD INTERMITTENT ASTHMA WITHOUT COMPLICATION: Status: RESOLVED | Noted: 2021-08-02 | Resolved: 2022-08-02

## 2022-08-02 PROCEDURE — 99214 OFFICE O/P EST MOD 30 MIN: CPT | Performed by: PHYSICIAN ASSISTANT

## 2022-08-02 RX ORDER — MONTELUKAST SODIUM 10 MG/1
TABLET ORAL
Qty: 90 TABLET | Refills: 3 | Status: SHIPPED | OUTPATIENT
Start: 2022-08-02 | End: 2023-08-29

## 2022-08-02 ASSESSMENT — PATIENT HEALTH QUESTIONNAIRE - PHQ9
5. POOR APPETITE OR OVEREATING: SEVERAL DAYS
SUM OF ALL RESPONSES TO PHQ QUESTIONS 1-9: 4

## 2022-08-02 ASSESSMENT — ANXIETY QUESTIONNAIRES
IF YOU CHECKED OFF ANY PROBLEMS ON THIS QUESTIONNAIRE, HOW DIFFICULT HAVE THESE PROBLEMS MADE IT FOR YOU TO DO YOUR WORK, TAKE CARE OF THINGS AT HOME, OR GET ALONG WITH OTHER PEOPLE: SOMEWHAT DIFFICULT
6. BECOMING EASILY ANNOYED OR IRRITABLE: SEVERAL DAYS
1. FEELING NERVOUS, ANXIOUS, OR ON EDGE: SEVERAL DAYS
GAD7 TOTAL SCORE: 5
2. NOT BEING ABLE TO STOP OR CONTROL WORRYING: SEVERAL DAYS
5. BEING SO RESTLESS THAT IT IS HARD TO SIT STILL: NOT AT ALL
7. FEELING AFRAID AS IF SOMETHING AWFUL MIGHT HAPPEN: NOT AT ALL
GAD7 TOTAL SCORE: 5
3. WORRYING TOO MUCH ABOUT DIFFERENT THINGS: SEVERAL DAYS

## 2022-08-02 NOTE — PROGRESS NOTES
Assessment & Plan     Mixed anxiety depressive disorder  Inc to 75 mg (pt prefers to do this rather than 100 mg)  Will send PHQ in 1 month  - sertraline (ZOLOFT) 50 MG tablet  Dispense: 135 tablet; Refill: 3    Chronic seasonal allergic rhinitis due to pollen  Add Singulair  I reviewed the patient information handout from Up To Date on this medication including side effects with the patient.    - montelukast (SINGULAIR) 10 MG tablet  Dispense: 90 tablet; Refill: 3    Inattention  Advised PhD Psychologist helen   Emailed her books for CBT for ADHD    Other migraine without status migrainosus, not intractable  controlled        FUTURE APPOINTMENTS:       - Follow-up visit in 1 year       Zoe Blevins, PA  [facility]    Subjective     Nursing Notes:   Lyubov Hwang CMA  8/2/2022 11:44 AM  Signed  Chief Complaint   Patient presents with     Recheck Medication     Refill medications, non-fasting today      Pre-visit Screening:  Immunizations:  up to date  Colonoscopy:  NA  Mammogram: NA  Asthma Action Test/Plan:  NA  PHQ9:  Done today  GAD7:  Done today  Questioned patient about current smoking habits Pt. has never smoked.  Ok to leave detailed message on voice mail for today's visit only Yes, phone # [phone]                Radha Longoria is a 26 year old female who presents to clinic today for the following health issues     HPI       Here for Follow-up on medication  Feels like inattention issues  Work:  for [employer]  In office 1 day a week, working from Home.       Previously on higher dose of Sertraline  Can't remember AE      Allergies  Singulair - itchy eyes, stuffy nose  Allegra    Hx mild intermittent asthma  Hasn't used inhaler since College with [employer]     Migraines - improved  Once a month  Advil           Current Medications  Current Outpatient Medications   Medication Sig Dispense Refill     levonorgestrel (MIRENA) 20 MCG/24HR IUD 1 each (20 mcg) by  "Intrauterine route once for 1 dose 1 each 0     montelukast (SINGULAIR) 10 MG tablet TAKE 1 TABLET (10 MG) BY MOUTH AT BEDTIME 90 tablet 3     sertraline (ZOLOFT) 50 MG tablet Take 1.5 tablets (75 mg) by mouth daily 135 tablet 3     spironolactone (ALDACTONE) 100 MG tablet Take 100 mg by mouth daily       tretinoin (RETIN-A) 0.1 % external cream APPLY PEA SIZED AMOUNT TO FACE NIGHTLY AS TOLERATED.           Constitutional, HEENT, Cardiovascular, Pulmonary, GI and  systems are negative, except as otherwise noted.          Objective    /68 (BP Location: Left arm, Patient Position: Sitting, Cuff Size: Adult Regular)   Pulse 65   Temp 98.4  F (36.9  C) (Temporal)   Ht 1.702 m (5' 7\")   Wt 82 kg (180 lb 12.8 oz)   SpO2 97%   BMI 28.32 kg/m    Body mass index is 28.32 kg/m .  Physical Exam   GENERAL: healthy, alert and no distress  RESP: lungs clear to auscultation - no rales, rhonchi or wheezes  CV: regular rate and rhythm, normal S1 S2, no S3 or S4, no murmur, click or rub, no peripheral edema and peripheral pulses strong  MS: no gross musculoskeletal defects noted    Mental Status Exam:   Appearance: calm  Grooming: adequately groomed  Demeanor: engaged, cooperative  Affect: normal  Speech: Normal.  Gait:Normal.  Movements: Normal  Form of thought: Logical, Linear and Goal directed  Thought content:  Normal  Insight:Good   Judgment: Good   Cognition: Good           "

## 2022-08-02 NOTE — NURSING NOTE
Chief Complaint   Patient presents with     Recheck Medication     Refill medications, non-fasting today      Pre-visit Screening:  Immunizations:  up to date  Colonoscopy:  NA  Mammogram: NA  Asthma Action Test/Plan:  NA  PHQ9:  Done today  GAD7:  Done today  Questioned patient about current smoking habits Pt. has never smoked.  Ok to leave detailed message on voice mail for today's visit only Yes, phone # [phone]

## 2022-08-03 PROBLEM — G43.809 OTHER MIGRAINE WITHOUT STATUS MIGRAINOSUS, NOT INTRACTABLE: Status: ACTIVE | Noted: 2022-08-03

## 2022-10-09 ENCOUNTER — HEALTH MAINTENANCE LETTER (OUTPATIENT)
Age: 27
End: 2022-10-09

## 2023-03-25 ENCOUNTER — HEALTH MAINTENANCE LETTER (OUTPATIENT)
Age: 28
End: 2023-03-25

## 2023-08-06 DIAGNOSIS — J30.1 CHRONIC SEASONAL ALLERGIC RHINITIS DUE TO POLLEN: ICD-10-CM

## 2023-08-06 DIAGNOSIS — F41.8 MIXED ANXIETY DEPRESSIVE DISORDER: ICD-10-CM

## 2023-08-07 RX ORDER — MONTELUKAST SODIUM 10 MG/1
TABLET ORAL
Qty: 90 TABLET | Refills: 3 | COMMUNITY
Start: 2023-08-07

## 2023-08-07 NOTE — TELEPHONE ENCOUNTER
Radha Longoria is requesting a refill of:    Refused Prescriptions:                       Disp   Refills    sertraline (ZOLOFT) 50 MG tablet [Pharmacy*135 ta*3        Sig: TAKE 1.5 TABLETS BY MOUTH DAILY  Refused By: YAMILET HERNANDEZ  Reason for Refusal: Patient needs appointment    montelukast (SINGULAIR) 10 MG tablet [Phar*90 tab*3        Sig: TAKE 1 TABLET BY MOUTH EVERYDAY AT BEDTIME  Refused By: YAMILET HERNANDEZ  Reason for Refusal: Patient needs appointment    Pt last seen 08/02/22, due for yearly OV

## 2023-08-29 ENCOUNTER — OFFICE VISIT (OUTPATIENT)
Dept: FAMILY MEDICINE | Facility: CLINIC | Age: 28
End: 2023-08-29

## 2023-08-29 VITALS
TEMPERATURE: 97.8 F | SYSTOLIC BLOOD PRESSURE: 110 MMHG | BODY MASS INDEX: 30.01 KG/M2 | HEIGHT: 68 IN | WEIGHT: 198 LBS | HEART RATE: 73 BPM | DIASTOLIC BLOOD PRESSURE: 64 MMHG | OXYGEN SATURATION: 98 %

## 2023-08-29 DIAGNOSIS — F41.8 MIXED ANXIETY DEPRESSIVE DISORDER: Primary | ICD-10-CM

## 2023-08-29 DIAGNOSIS — L70.0 ACNE VULGARIS: ICD-10-CM

## 2023-08-29 DIAGNOSIS — J30.1 CHRONIC SEASONAL ALLERGIC RHINITIS DUE TO POLLEN: ICD-10-CM

## 2023-08-29 DIAGNOSIS — J45.20 MILD INTERMITTENT ASTHMA WITHOUT COMPLICATION: ICD-10-CM

## 2023-08-29 LAB
BUN SERPL-MCNC: 12 MG/DL (ref 7–25)
BUN/CREATININE RATIO: 16.2 (ref 6–32)
CALCIUM SERPL-MCNC: 9.4 MG/DL (ref 8.6–10.3)
CHLORIDE SERPLBLD-SCNC: 102.5 MMOL/L (ref 98–110)
CO2 SERPL-SCNC: 26.5 MMOL/L (ref 20–32)
CREAT SERPL-MCNC: 0.74 MG/DL (ref 0.6–1.3)
GLUCOSE SERPL-MCNC: 93 MG/DL (ref 60–99)
POTASSIUM SERPL-SCNC: 4.29 MMOL/L (ref 3.5–5.3)
SODIUM SERPL-SCNC: 138.3 MMOL/L (ref 135–146)

## 2023-08-29 PROCEDURE — 36415 COLL VENOUS BLD VENIPUNCTURE: CPT | Performed by: PHYSICIAN ASSISTANT

## 2023-08-29 PROCEDURE — 80048 BASIC METABOLIC PNL TOTAL CA: CPT | Performed by: PHYSICIAN ASSISTANT

## 2023-08-29 PROCEDURE — 99214 OFFICE O/P EST MOD 30 MIN: CPT | Performed by: PHYSICIAN ASSISTANT

## 2023-08-29 RX ORDER — MONTELUKAST SODIUM 10 MG/1
TABLET ORAL
Qty: 90 TABLET | Refills: 3 | Status: SHIPPED | OUTPATIENT
Start: 2023-08-29 | End: 2024-06-21

## 2023-08-29 RX ORDER — SPIRONOLACTONE 50 MG/1
50 TABLET, FILM COATED ORAL DAILY
Qty: 90 TABLET | Refills: 0 | Status: SHIPPED | OUTPATIENT
Start: 2023-08-29 | End: 2024-06-21

## 2023-08-29 RX ORDER — ESCITALOPRAM OXALATE 10 MG/1
10 TABLET ORAL DAILY
Qty: 30 TABLET | Refills: 2 | Status: SHIPPED | OUTPATIENT
Start: 2023-08-29 | End: 2023-11-03

## 2023-08-29 RX ORDER — SERTRALINE HYDROCHLORIDE 100 MG/1
100 TABLET, FILM COATED ORAL DAILY
Qty: 90 TABLET | Status: CANCELLED | OUTPATIENT
Start: 2023-08-29

## 2023-08-29 ASSESSMENT — ANXIETY QUESTIONNAIRES
7. FEELING AFRAID AS IF SOMETHING AWFUL MIGHT HAPPEN: NOT AT ALL
6. BECOMING EASILY ANNOYED OR IRRITABLE: MORE THAN HALF THE DAYS
IF YOU CHECKED OFF ANY PROBLEMS ON THIS QUESTIONNAIRE, HOW DIFFICULT HAVE THESE PROBLEMS MADE IT FOR YOU TO DO YOUR WORK, TAKE CARE OF THINGS AT HOME, OR GET ALONG WITH OTHER PEOPLE: SOMEWHAT DIFFICULT
3. WORRYING TOO MUCH ABOUT DIFFERENT THINGS: SEVERAL DAYS
GAD7 TOTAL SCORE: 7
1. FEELING NERVOUS, ANXIOUS, OR ON EDGE: SEVERAL DAYS
GAD7 TOTAL SCORE: 7
5. BEING SO RESTLESS THAT IT IS HARD TO SIT STILL: SEVERAL DAYS
2. NOT BEING ABLE TO STOP OR CONTROL WORRYING: SEVERAL DAYS

## 2023-08-29 ASSESSMENT — PATIENT HEALTH QUESTIONNAIRE - PHQ9
SUM OF ALL RESPONSES TO PHQ QUESTIONS 1-9: 3
5. POOR APPETITE OR OVEREATING: SEVERAL DAYS

## 2023-08-29 ASSESSMENT — ASTHMA QUESTIONNAIRES: ACT_TOTALSCORE: 25

## 2023-08-29 NOTE — NURSING NOTE
Chief Complaint   Patient presents with    Recheck Medication     Non fasting med refill       Pre-visit Screening:  Immunizations:  up to date  Colonoscopy:  NA  Mammogram: NA  Asthma Action Test/Plan:  Yes  PHQ9:  Done today  GAD7:  Done today  Questioned patient about current smoking habits Pt. has never smoked.  Ok to leave detailed message on voice mail for today's visit only Yes, phone # [phone]

## 2023-08-29 NOTE — PROGRESS NOTES
"CC: Medication Check, Acne    History:  Mixed anxiety depression disorder:  Increased sertraline to 100 mg daily at 8/2022 appt. Did not notice any further improvement from this change. Anxiety still seems to be active - \"getting worried about things for no reason\". Recently, has been harder to shut her mind off, always thinking about things she need to do. Can't relax. Has been working from home 3 days per weeks, and has difficulty concentrating. Has been offered ADHD eval in the past but declines.     Chronic rhinitis:   Stable on daily antihistamine and montelukast daily. Works well.     Asthma:  Not needing inhaler. Only needed with college sports.     Acne:  Used to be on spironolactone through dermatologist. This was primarily for facial acne, but since she has been off this, feels like she has been getting worsening acne on her back. Would like to restart this.     PMH, MEDICATIONS, ALLERGIES, SOCIAL AND FAMILY HISTORY in [facility] and reviewed by me personally.    ROS negative other than the symptoms noted above in the HPI.      Examination   /64 (BP Location: Left arm, Patient Position: Sitting, Cuff Size: Adult Large)   Pulse 73   Temp 97.8  F (36.6  C) (Temporal)   Ht 1.727 m (5' 8\")   Wt 89.8 kg (198 lb)   SpO2 98%   BMI 30.11 kg/m       Constitutional: Sitting comfortably, in no acute distress. Vital signs noted  Neck:  no adenopathy, trachea midline and normal to palpation, thyroid normal to palpation  Cardiovascular:  regular rate and rhythm, no murmurs, clicks, or gallops  Respiratory:  normal respiratory rate and rhythm, lungs clear to auscultation  SKIN: No jaundice/pallor/rash.   Psychiatric: mentation appears normal and affect normal/bright      A/P    ICD-10-CM    1. Mild intermittent asthma without complication  J45.20 Asthma Action Plan (AAP)      2. Chronic seasonal allergic rhinitis due to pollen  J30.1 montelukast (SINGULAIR) 10 MG tablet      3. Mixed anxiety depressive disorder  " F41.8 escitalopram (LEXAPRO) 10 MG tablet      4. Acne vulgaris  L70.0 VENOUS COLLECTION     Basic Metabolic Panel (BFP)     spironolactone (ALDACTONE) 50 MG tablet          DISCUSSION:  Mixed anxiety depression disorder:  Given that still having notable symptoms despite increased sertraline, recommended trial of escitalopram 10 mg. Can direct switch. Take in the morning or night, take with food. Monitor for side effects, including sleep changes, worsening depression, weight changes, GI issues, sexual changes, and contact me if noted. Otherwise, filled for 3 months and should schedule an appt at that time to discuss medication, symptoms. Avoid alcohol while taking.  Contact me with any concerns/worsening. Proceed to ER with any significant worsening.     Chronic rhinitis:  Agreed to montelukast to continue taking daily.     Asthma:  Reassured that hasn't needed inhaler. Would refill for 1 year if needed.     Acne:  Agreed to restart on spironolactone previously from dermatologist. Will update non-fasting BMP today and send MyChart. Will prescribe 50 mg to start with 1/2 tablet for first 1-2 weeks before increasing up to 50 mg. Had been on 100 mg before so could eventually increase up to that dose.     follow up visit: 2-3 months to recheck meds, including spironolactone.     Connie Correa PA-C  [facility]

## 2023-10-28 DIAGNOSIS — F41.8 MIXED ANXIETY DEPRESSIVE DISORDER: ICD-10-CM

## 2023-10-29 RX ORDER — ESCITALOPRAM OXALATE 10 MG/1
10 TABLET ORAL DAILY
COMMUNITY
Start: 2023-10-29

## 2023-11-03 RX ORDER — ESCITALOPRAM OXALATE 10 MG/1
10 TABLET ORAL DAILY
Qty: 20 TABLET | Refills: 0 | Status: SHIPPED | OUTPATIENT
Start: 2023-11-03 | End: 2024-06-21

## 2023-11-03 NOTE — TELEPHONE ENCOUNTER
Pt scheduled appt for 11/21, needs short supply of escitalopram. Can you send for SRB?    Radha Longoria is requesting a refill of:    Pending Prescriptions:                       Disp   Refills    escitalopram (LEXAPRO) 10 MG tablet       20 tab*0            Sig: Take 1 tablet (10 mg) by mouth daily  Refused Prescriptions:                       Disp   Refills    escitalopram (LEXAPRO) 10 MG tablet [Pharm*                Sig: TAKE 1 TABLET (10 MG) BY MOUTH DAILY.  Refused By: HEATHER SENIOR  Reason for Refusal: Patient has requested refill too soon  Reason for Refusal Comment: should have another 30 day supply

## 2023-11-21 ENCOUNTER — OFFICE VISIT (OUTPATIENT)
Dept: FAMILY MEDICINE | Facility: CLINIC | Age: 28
End: 2023-11-21

## 2023-11-21 VITALS
DIASTOLIC BLOOD PRESSURE: 72 MMHG | OXYGEN SATURATION: 98 % | WEIGHT: 201 LBS | HEART RATE: 67 BPM | TEMPERATURE: 98 F | HEIGHT: 68 IN | SYSTOLIC BLOOD PRESSURE: 120 MMHG | BODY MASS INDEX: 30.46 KG/M2

## 2023-11-21 DIAGNOSIS — F41.8 MIXED ANXIETY DEPRESSIVE DISORDER: ICD-10-CM

## 2023-11-21 DIAGNOSIS — L70.0 ACNE VULGARIS: ICD-10-CM

## 2023-11-21 LAB
BUN SERPL-MCNC: 9 MG/DL (ref 7–25)
BUN/CREATININE RATIO: 10.7 (ref 6–32)
CALCIUM SERPL-MCNC: 9.4 MG/DL (ref 8.6–10.3)
CHLORIDE SERPLBLD-SCNC: 105.4 MMOL/L (ref 98–110)
CO2 SERPL-SCNC: 23.7 MMOL/L (ref 20–32)
CREAT SERPL-MCNC: 0.84 MG/DL (ref 0.6–1.3)
GLUCOSE SERPL-MCNC: 82 MG/DL (ref 60–99)
POTASSIUM SERPL-SCNC: 4.74 MMOL/L (ref 3.5–5.3)
SODIUM SERPL-SCNC: 137.2 MMOL/L (ref 135–146)

## 2023-11-21 PROCEDURE — 99213 OFFICE O/P EST LOW 20 MIN: CPT | Performed by: PHYSICIAN ASSISTANT

## 2023-11-21 PROCEDURE — 36415 COLL VENOUS BLD VENIPUNCTURE: CPT | Performed by: PHYSICIAN ASSISTANT

## 2023-11-21 PROCEDURE — 80048 BASIC METABOLIC PNL TOTAL CA: CPT | Performed by: PHYSICIAN ASSISTANT

## 2023-11-21 RX ORDER — SPIRONOLACTONE 100 MG/1
100 TABLET, FILM COATED ORAL DAILY
Qty: 90 TABLET | Refills: 0 | Status: SHIPPED | OUTPATIENT
Start: 2023-11-21 | End: 2024-06-21

## 2023-11-21 RX ORDER — ESCITALOPRAM OXALATE 10 MG/1
10 TABLET ORAL DAILY
Qty: 20 TABLET | Refills: 0 | Status: CANCELLED | OUTPATIENT
Start: 2023-11-21

## 2023-11-21 RX ORDER — SPIRONOLACTONE 50 MG/1
50 TABLET, FILM COATED ORAL DAILY
Qty: 90 TABLET | Refills: 0 | Status: CANCELLED | OUTPATIENT
Start: 2023-11-21

## 2023-11-21 ASSESSMENT — ANXIETY QUESTIONNAIRES
IF YOU CHECKED OFF ANY PROBLEMS ON THIS QUESTIONNAIRE, HOW DIFFICULT HAVE THESE PROBLEMS MADE IT FOR YOU TO DO YOUR WORK, TAKE CARE OF THINGS AT HOME, OR GET ALONG WITH OTHER PEOPLE: SOMEWHAT DIFFICULT
6. BECOMING EASILY ANNOYED OR IRRITABLE: MORE THAN HALF THE DAYS
GAD7 TOTAL SCORE: 6
1. FEELING NERVOUS, ANXIOUS, OR ON EDGE: SEVERAL DAYS
GAD7 TOTAL SCORE: 6
5. BEING SO RESTLESS THAT IT IS HARD TO SIT STILL: SEVERAL DAYS
7. FEELING AFRAID AS IF SOMETHING AWFUL MIGHT HAPPEN: NOT AT ALL
3. WORRYING TOO MUCH ABOUT DIFFERENT THINGS: SEVERAL DAYS
2. NOT BEING ABLE TO STOP OR CONTROL WORRYING: NOT AT ALL

## 2023-11-21 ASSESSMENT — PATIENT HEALTH QUESTIONNAIRE - PHQ9
SUM OF ALL RESPONSES TO PHQ QUESTIONS 1-9: 5
5. POOR APPETITE OR OVEREATING: SEVERAL DAYS

## 2023-11-21 NOTE — NURSING NOTE
Chief Complaint   Patient presents with    Recheck Medication     Mental health med recheck         Pre-visit Screening:  Immunizations:  up to date  Colonoscopy:  NA  Mammogram: NA  Asthma Action Test/Plan:  NA  PHQ9:  Done today  GAD7:  Done today  Questioned patient about current smoking habits Pt. has never smoked.  Ok to leave detailed message on voice mail for today's visit only Yes, phone # [phone]

## 2023-11-21 NOTE — PROGRESS NOTES
"CC: Medication Check    History:   Mixed anxiety depression disorder:  Was here 8/2023 with some worsening anxiety, frequent worries. Switched from sertraline to escitalopram at that time. Overall, didn't seem to cause much improvement. Feels like falling asleep is slightly harder despite AM dosing. The last couple months has been less motivated, especially between contracts with her job, which may be part of the problem.      Acne:  Was restarted on spironolactone 50 mg for acne. Not completely happy with complexion. Has been on spironolactone 100 mg daily in the past through dermatology, but hasn't seen them in years. Also uses Differin topical.     PMH, MEDICATIONS, ALLERGIES, SOCIAL AND FAMILY HISTORY in [facility] and reviewed by me personally.    ROS negative other than the symptoms noted above in the HPI.      Examination   /72 (BP Location: Left arm, Patient Position: Sitting, Cuff Size: Adult Large)   Pulse 67   Temp 98  F (36.7  C) (Temporal)   Ht 1.727 m (5' 8\")   Wt 91.2 kg (201 lb)   SpO2 98%   BMI 30.56 kg/m         Constitutional: Sitting comfortably, in no acute distress. Vital signs noted  Neck:  no adenopathy, trachea midline and normal to palpation, thyroid normal to palpation  Cardiovascular:  regular rate and rhythm, no murmurs, clicks, or gallops  Respiratory:  normal respiratory rate and rhythm, lungs clear to auscultation  SKIN: No jaundice/pallor/rash.   Psychiatric: mentation appears normal and affect normal/bright      A/P    ICD-10-CM    1. Mixed anxiety depressive disorder  F41.8 FLUoxetine (PROZAC) 20 MG capsule      2. Acne vulgaris  L70.0 Basic Metabolic Panel (BFP)     VENOUS COLLECTION     spironolactone (ALDACTONE) 100 MG tablet          DISCUSSION:  Mixed anxiety depression disorder:  PHQ-9 and SAMANTHA-7 updated today and reasonably controlled. No SI/HI. However concern for insomnia side effect on escitalopram. Recommended change to fluoxetine 20 mg daily. Monitor for side " effects, effectiveness. Would eventually consider trial of Wellbutrin dual therapy, but hesitant to use as monotherapy given anxiety component. Recheck in 2-3 months. Contact me sooner with worsening.     Acne:  Agreed to do trial increasing to spironolactone 100 mg daily. Will recheck non-fasting BMP today and send MyChart. Monitor for side effects. Recheck in 3 months.     follow up visit: 3 months    Connie Correa PA-C  [facility]

## 2023-11-22 DIAGNOSIS — L70.0 ACNE VULGARIS: ICD-10-CM

## 2023-11-22 RX ORDER — SPIRONOLACTONE 50 MG/1
50 TABLET, FILM COATED ORAL DAILY
COMMUNITY
Start: 2023-11-22

## 2023-11-22 NOTE — TELEPHONE ENCOUNTER
Denied refill request for spironolactone 50 mg, pt received a refill for 100 mg yesterday.      Refused Prescriptions:                       Disp   Refills    spironolactone (ALDACTONE) 50 MG tablet [P*                Sig: TAKE 1 TABLET BY MOUTH EVERY DAY  Refused By: LANCE SAWANT  Reason for Refusal: Adjustment in Therapy

## 2023-12-21 DIAGNOSIS — F41.8 MIXED ANXIETY DEPRESSIVE DISORDER: ICD-10-CM

## 2023-12-22 NOTE — TELEPHONE ENCOUNTER
Signed Prescriptions:                        Disp   Refills    FLUoxetine (PROZAC) 20 MG capsule                          Sig: TAKE 1 CAPSULE BY MOUTH EVERY DAY  Authorizing Provider: MATT GALLO  Ordering User: LANCE SAWANT      This was a new medication at her last visit- therefore it was intended to be 30 day with 1 refill in case of side effects, will be due for med check before running out of medication.

## 2024-01-05 DIAGNOSIS — L70.0 ACNE VULGARIS: ICD-10-CM

## 2024-01-05 DIAGNOSIS — F41.8 MIXED ANXIETY DEPRESSIVE DISORDER: ICD-10-CM

## 2024-01-05 RX ORDER — SPIRONOLACTONE 100 MG/1
100 TABLET, FILM COATED ORAL DAILY
COMMUNITY
Start: 2024-01-05

## 2024-01-05 NOTE — TELEPHONE ENCOUNTER
Patient called into the CS line stating that she thought she had another month supply of her fluoxetine but the pharmacy stated that she does not. She is not due until next month for her med check and is requesting the 30 day supply to be sent in. We did receive a refill request for   Pending Prescriptions:                       Disp   Refills    spironolactone (ALDACTONE) 100 MG tablet *90 tab*0            Sig: TAKE 1 TABLET BY MOUTH EVERY DAY    But patient will have enough of this until next month so this was denied and pended fluoxetine.

## 2024-01-05 NOTE — TELEPHONE ENCOUNTER
Pt's fluoxetine was accidentally discontinued. Can you send 30 day supply, due for OV next month.    Radha Longoria is requesting a refill of:    Pending Prescriptions:                       Disp   Refills    FLUoxetine (PROZAC) 20 MG capsule         30 cap*0            Sig: Take 1 capsule (20 mg) by mouth daily  Refused Prescriptions:                       Disp   Refills    spironolactone (ALDACTONE) 100 MG tablet [*                Sig: TAKE 1 TABLET BY MOUTH EVERY DAY  Refused By: HEATHER SENIOR  Reason for Refusal: Patient has requested refill too soon  Reason for Refusal Comment: 90 day supply sent 11/21/23, due for OV after that    FLUoxetine (PROZAC) 20 MG capsule                          Sig: Take 1 capsule (20 mg) by mouth daily  Refused By: YAMILET HERNANDEZ  Reason for Refusal: Patient needs appointment

## 2024-02-02 DIAGNOSIS — F41.8 MIXED ANXIETY DEPRESSIVE DISORDER: ICD-10-CM

## 2024-02-02 NOTE — TELEPHONE ENCOUNTER
Radha Longoria is requesting a refill of:    Refused Prescriptions:                       Disp   Refills    FLUoxetine (PROZAC) 20 MG capsule [Pharmac*                Sig: TAKE 1 CAPSULE BY MOUTH EVERY DAY  Refused By: YAMILET HERNANDEZ  Reason for Refusal: Patient needs appointment    Pt due for OV

## 2024-04-04 ENCOUNTER — TELEPHONE (OUTPATIENT)
Dept: FAMILY MEDICINE | Facility: CLINIC | Age: 29
End: 2024-04-04

## 2024-04-04 DIAGNOSIS — F41.8 MIXED ANXIETY DEPRESSIVE DISORDER: Primary | ICD-10-CM

## 2024-04-04 RX ORDER — SERTRALINE HYDROCHLORIDE 100 MG/1
TABLET, FILM COATED ORAL
Qty: 30 TABLET | Refills: 1 | Status: SHIPPED | OUTPATIENT
Start: 2024-04-04 | End: 2024-06-21

## 2024-04-04 NOTE — TELEPHONE ENCOUNTER
Given that she has tolerated in the past, and just find it be effective enough, approved refills of sertraline 100 mg to retry. Start with 1/2 tablet daily for 2 weeks, then increase to full tablet. Take with food. Monitor for side effects. Sent through #30 with 1 refill, so would be due for recheck in 2 months. Would consider adding Wellbutrin at that time, if no personal/FH seizures, or possible psych referral, genesight testing, or change to SNRI if no significant benefit.

## 2024-04-04 NOTE — TELEPHONE ENCOUNTER
Pt called stating she is no longer taking the fluoxetine as she did not like that prescription. She said she has tried multiple medications for her depression and would like to revert back to sertraline. She said she felt best on the sertraline 100MG. She is hoping to restart this medication first prior to scheduling another appointment.    Please advise # [phone]    Thanks, Lyubov GUERRA

## 2024-04-29 DIAGNOSIS — F41.8 MIXED ANXIETY DEPRESSIVE DISORDER: ICD-10-CM

## 2024-04-30 RX ORDER — SERTRALINE HYDROCHLORIDE 100 MG/1
TABLET, FILM COATED ORAL
COMMUNITY
Start: 2024-04-30

## 2024-04-30 NOTE — TELEPHONE ENCOUNTER
Radha Longoria is requesting a refill of:    Refused Prescriptions:                       Disp   Refills    sertraline (ZOLOFT) 100 MG tablet [Pharmac*                Sig: TAKE 0.5 TABLET (50 MG) BY MOUTH ONCE DAILY FOR 2           WEEKS, THEN INCREASE TO 1 TABLET (100 MG) DAILY.  Refused By: YAMILET HERNANDEZ  Reason for Refusal: Patient needs appointment    Pt due for OV

## 2024-05-25 ENCOUNTER — HEALTH MAINTENANCE LETTER (OUTPATIENT)
Age: 29
End: 2024-05-25

## 2024-06-08 DIAGNOSIS — F41.8 MIXED ANXIETY DEPRESSIVE DISORDER: ICD-10-CM

## 2024-06-11 RX ORDER — SERTRALINE HYDROCHLORIDE 100 MG/1
TABLET, FILM COATED ORAL
COMMUNITY
Start: 2024-06-11

## 2024-06-11 NOTE — TELEPHONE ENCOUNTER
Radha Longoria is requesting a refill of:    Refused Prescriptions:                       Disp   Refills    sertraline (ZOLOFT) 100 MG tablet [Pharmac*                Sig: TAKE 0.5 TABLET (50 MG) BY MOUTH ONCE DAILY FOR 2           WEEKS, THEN INCREASE TO 1 TABLET (100 MG) DAILY.  Refused By: YAMILET HERNANDEZ  Reason for Refusal: Patient needs appointment    Pt due for OV, previously sent [employer] message

## 2024-06-18 DIAGNOSIS — F41.8 MIXED ANXIETY DEPRESSIVE DISORDER: ICD-10-CM

## 2024-06-20 RX ORDER — SERTRALINE HYDROCHLORIDE 100 MG/1
TABLET, FILM COATED ORAL
COMMUNITY
Start: 2024-06-20

## 2024-06-20 NOTE — TELEPHONE ENCOUNTER
Radha Longoria is requesting a refill of:    Refused Prescriptions:                       Disp   Refills    sertraline (ZOLOFT) 100 MG tablet [Pharmac*                Sig: TAKE 0.5 TABLET (50 MG) BY MOUTH ONCE DAILY FOR 2           WEEKS, THEN INCREASE TO 1 TABLET (100 MG) DAILY.  Refused By: YAMILTE HERNANDEZ  Reason for Refusal: Patient needs appointment

## 2024-06-21 ENCOUNTER — OFFICE VISIT (OUTPATIENT)
Dept: FAMILY MEDICINE | Facility: CLINIC | Age: 29
End: 2024-06-21

## 2024-06-21 VITALS
DIASTOLIC BLOOD PRESSURE: 68 MMHG | RESPIRATION RATE: 20 BRPM | SYSTOLIC BLOOD PRESSURE: 102 MMHG | WEIGHT: 185 LBS | HEART RATE: 63 BPM | OXYGEN SATURATION: 97 % | BODY MASS INDEX: 28.13 KG/M2

## 2024-06-21 DIAGNOSIS — L70.0 ACNE VULGARIS: ICD-10-CM

## 2024-06-21 DIAGNOSIS — F41.8 MIXED ANXIETY DEPRESSIVE DISORDER: Primary | ICD-10-CM

## 2024-06-21 DIAGNOSIS — J30.1 CHRONIC SEASONAL ALLERGIC RHINITIS DUE TO POLLEN: ICD-10-CM

## 2024-06-21 PROCEDURE — 99213 OFFICE O/P EST LOW 20 MIN: CPT

## 2024-06-21 RX ORDER — MONTELUKAST SODIUM 10 MG/1
TABLET ORAL
Qty: 90 TABLET | Refills: 3 | Status: SHIPPED | OUTPATIENT
Start: 2024-06-21

## 2024-06-21 RX ORDER — FEXOFENADINE HCL 180 MG/1
180 TABLET ORAL DAILY
COMMUNITY

## 2024-06-21 RX ORDER — SERTRALINE HYDROCHLORIDE 100 MG/1
100 TABLET, FILM COATED ORAL DAILY
Qty: 90 TABLET | Refills: 3 | Status: SHIPPED | OUTPATIENT
Start: 2024-06-21

## 2024-06-21 RX ORDER — SPIRONOLACTONE 100 MG/1
100 TABLET, FILM COATED ORAL DAILY
Qty: 90 TABLET | Refills: 0 | Status: SHIPPED | OUTPATIENT
Start: 2024-06-21 | End: 2024-06-21

## 2024-06-21 RX ORDER — SPIRONOLACTONE 100 MG/1
100 TABLET, FILM COATED ORAL DAILY
Qty: 90 TABLET | Refills: 3 | Status: SHIPPED | OUTPATIENT
Start: 2024-06-21

## 2024-06-21 ASSESSMENT — ANXIETY QUESTIONNAIRES
1. FEELING NERVOUS, ANXIOUS, OR ON EDGE: SEVERAL DAYS
2. NOT BEING ABLE TO STOP OR CONTROL WORRYING: SEVERAL DAYS
GAD7 TOTAL SCORE: 8
7. FEELING AFRAID AS IF SOMETHING AWFUL MIGHT HAPPEN: NOT AT ALL
GAD7 TOTAL SCORE: 8
3. WORRYING TOO MUCH ABOUT DIFFERENT THINGS: SEVERAL DAYS
5. BEING SO RESTLESS THAT IT IS HARD TO SIT STILL: SEVERAL DAYS
6. BECOMING EASILY ANNOYED OR IRRITABLE: MORE THAN HALF THE DAYS

## 2024-06-21 ASSESSMENT — PATIENT HEALTH QUESTIONNAIRE - PHQ9
5. POOR APPETITE OR OVEREATING: MORE THAN HALF THE DAYS
SUM OF ALL RESPONSES TO PHQ QUESTIONS 1-9: 3

## 2024-06-21 ASSESSMENT — ASTHMA QUESTIONNAIRES: ACT_TOTALSCORE: 25

## 2024-06-21 NOTE — PROGRESS NOTES
Assessment & Plan     1. Mixed anxiety depressive disorder  - Well controlled, refills for Zoloft 100 mg daily sent for one year. Radha will follow up sooner if there are any problems or concerns.   - sertraline (ZOLOFT) 100 MG tablet; Take 1 tablet (100 mg) by mouth daily  Dispense: 90 tablet; Refill: 3    2. Chronic seasonal allergic rhinitis due to pollen  - Well controlled, refills for Montelukast 10 mg daily sent for one year. Radha will follow up sooner if there are any problems or concerns.   - montelukast (SINGULAIR) 10 MG tablet; TAKE 1 TABLET (10 MG) BY MOUTH AT BEDTIME  Dispense: 90 tablet; Refill: 3    3. Acne vulgaris  - Well controlled, refills for Spironolactone 100 mg daily sent for one year. BMP from 11/21/23 reviewed and WNL. Radha will follow up sooner if there are any problems or concerns.   - spironolactone (ALDACTONE) 100 MG tablet; Take 1 tablet (100 mg) by mouth daily  Dispense: 90 tablet; Refill: 3    Follow up in 1 year for a medication recheck. Reasons to follow-up sooner or seek emergent care reviewed.     Brenna Simeon PA-C  [facility]       Subjective     Radha DORSEY Von is a 28 year old female who presents to clinic today for the following health issues:    HPI   Chief Complaint   Patient presents with    Recheck Medication     Non fasting medication check and review, patients wants to discontinue escitalopram and just continue on sertraline       Radha presents for a medication recheck.     Anxiety: Patient notes she has been currently taking Sertraline 100 mg daily since 04/04/24. She was last in for an office visit on 11/2023 and was started on Prozac however she notes she took this for a month but this did not help her symptoms so she stopped taking it and called in April to start taking Zoloft. Radha notes she was on Zoloft in the past and this seems to be the medication that helps with her anxiety the most (was on Lexapro in the past and also did not find this  helpful). She takes the medication every morning and denies any side effects. She also does not feel the medication affects her sleep. She notes with taking the medication she is less irritable and anxious. She denies any SI/HI.     Seasonal allergies: Currently taking Montelukast 10 mg every morning all year round, notes this works well to control her symptoms. She notes she also takes Allegra daily and uses eye drops as needed. She denies any side effects of the medication. She also notes she has a history of exercise induced asthma a few years ago however she notes she has not used an Albuterol inhaler for years now.     Acne: Currently taking Spironolactone 100 mg daily for acne, notes this is helping to control her acne well, which is typically flares and is worse on her face. She notes she used to get this filled through dermatology however has not been in to see them and was restarted on this in 11/2023. She denies any side effects. Last BMP checked in 11/2023 and was within normal limits.     Daily medications reviewed.       Objective    /68 (BP Location: Left arm, Patient Position: Sitting, Cuff Size: Adult Large)   Pulse 63   Resp 20   Wt 83.9 kg (185 lb)   SpO2 97%   BMI 28.13 kg/m    Body mass index is 28.13 kg/m .    Physical Examination:  GENERAL: healthy, alert and no distress  EYES: Eyes grossly normal to inspection, PERRL and conjunctivae and sclerae normal  HENT: ear canals and TM's normal, nose and mouth without ulcers or lesions  NECK: no adenopathy, no asymmetry, masses, or scars and thyroid normal to palpation  RESP: lungs clear to auscultation - no rales, rhonchi or wheezes  CV: regular rate and rhythm, normal S1 S2, no S3 or S4, no murmur, click or rub, no peripheral edema   ABDOMEN: soft and non-tender  MS: no gross musculoskeletal defects noted, no edema  SKIN: no suspicious lesions or rashes  PSYCH: mentation appears normal, affect normal/bright    Labs reviewed.         8/29/2023     2:07 PM 11/21/2023     1:57 PM 6/21/2024    12:23 PM   PHQ   PHQ-9 Total Score 3 5 3   Q9: Thoughts of better off dead/self-harm past 2 weeks Not at all Not at all Not at all          8/29/2023     2:07 PM 11/21/2023     1:57 PM 6/21/2024    12:23 PM   SAMANTHA-7 SCORE   Total Score 7 6 8

## 2024-06-21 NOTE — NURSING NOTE
Chief Complaint   Patient presents with    Recheck Medication     Non fasting medication check and review, patients wants to discontinue escitalopram and just continue on sertraline      Pre-visit Screening:  Immunizations:  up to date  Colonoscopy:  na  Mammogram: na  Asthma Action Test/Plan:  act given today   PHQ9:  given today   GAD7:  given today   Questioned patient about current smoking habits Pt. has never smoked.  Ok to leave detailed message on voice mail for today's visit only yes, phone # [phone] (home)

## 2025-06-14 ENCOUNTER — HEALTH MAINTENANCE LETTER (OUTPATIENT)
Age: 30
End: 2025-06-14